# Patient Record
Sex: MALE | Race: BLACK OR AFRICAN AMERICAN | NOT HISPANIC OR LATINO | RURAL
[De-identification: names, ages, dates, MRNs, and addresses within clinical notes are randomized per-mention and may not be internally consistent; named-entity substitution may affect disease eponyms.]

---

## 2023-03-23 ENCOUNTER — HOSPITAL ENCOUNTER (EMERGENCY)
Facility: HOSPITAL | Age: 43
Discharge: HOME | End: 2023-03-23
Payer: COMMERCIAL

## 2023-03-23 VITALS
TEMPERATURE: 98 F | HEART RATE: 57 BPM | BODY MASS INDEX: 21 KG/M2 | RESPIRATION RATE: 18 BRPM | OXYGEN SATURATION: 100 % | HEIGHT: 71 IN | SYSTOLIC BLOOD PRESSURE: 115 MMHG | WEIGHT: 150 LBS | DIASTOLIC BLOOD PRESSURE: 78 MMHG

## 2023-03-23 DIAGNOSIS — R11.10 VOMITING: ICD-10-CM

## 2023-03-23 DIAGNOSIS — K59.00 CONSTIPATION, UNSPECIFIED CONSTIPATION TYPE: Primary | ICD-10-CM

## 2023-03-23 LAB
ANION GAP SERPL CALCULATED.3IONS-SCNC: 11 MMOL/L (ref 7–16)
BASOPHILS # BLD AUTO: 0.02 K/UL (ref 0–0.2)
BASOPHILS NFR BLD AUTO: 0.4 % (ref 0–1)
BASOPHILS NFR BLD MANUAL: 1 % (ref 0–1)
BILIRUB UR QL STRIP: NEGATIVE
BUN SERPL-MCNC: 13 MG/DL (ref 7–18)
BUN/CREAT SERPL: 13 (ref 6–20)
CALCIUM SERPL-MCNC: 9.4 MG/DL (ref 8.5–10.1)
CHLORIDE SERPL-SCNC: 107 MMOL/L (ref 98–107)
CLARITY UR: CLEAR
CO2 SERPL-SCNC: 28 MMOL/L (ref 21–32)
COLOR UR: NORMAL
CREAT SERPL-MCNC: 0.97 MG/DL (ref 0.7–1.3)
DIFFERENTIAL METHOD BLD: ABNORMAL
EGFR (NO RACE VARIABLE) (RUSH/TITUS): 99 ML/MIN/1.73M²
EOSINOPHIL # BLD AUTO: 0.24 K/UL (ref 0–0.5)
EOSINOPHIL NFR BLD AUTO: 5 % (ref 1–4)
EOSINOPHIL NFR BLD MANUAL: 3 % (ref 1–4)
ERYTHROCYTE [DISTWIDTH] IN BLOOD BY AUTOMATED COUNT: 13.2 % (ref 11.5–14.5)
GLUCOSE SERPL-MCNC: 89 MG/DL (ref 74–106)
GLUCOSE UR STRIP-MCNC: NORMAL MG/DL
HCT VFR BLD AUTO: 38.8 % (ref 40–54)
HGB BLD-MCNC: 13.4 G/DL (ref 13.5–18)
IMM GRANULOCYTES # BLD AUTO: 0.01 K/UL (ref 0–0.04)
IMM GRANULOCYTES NFR BLD: 0.2 % (ref 0–0.4)
KETONES UR STRIP-SCNC: NEGATIVE MG/DL
LEUKOCYTE ESTERASE UR QL STRIP: NEGATIVE
LYMPHOCYTES # BLD AUTO: 1.85 K/UL (ref 1–4.8)
LYMPHOCYTES NFR BLD AUTO: 38.7 % (ref 27–41)
LYMPHOCYTES NFR BLD MANUAL: 27 % (ref 27–41)
MCH RBC QN AUTO: 31.4 PG (ref 27–31)
MCHC RBC AUTO-ENTMCNC: 34.5 G/DL (ref 32–36)
MCV RBC AUTO: 90.9 FL (ref 80–96)
MONOCYTES # BLD AUTO: 0.51 K/UL (ref 0–0.8)
MONOCYTES NFR BLD AUTO: 10.7 % (ref 2–6)
MONOCYTES NFR BLD MANUAL: 9 % (ref 2–6)
MPC BLD CALC-MCNC: 10.3 FL (ref 9.4–12.4)
NEUTROPHILS # BLD AUTO: 2.15 K/UL (ref 1.8–7.7)
NEUTROPHILS NFR BLD AUTO: 45 % (ref 53–65)
NEUTS SEG NFR BLD MANUAL: 60 % (ref 50–62)
NITRITE UR QL STRIP: NEGATIVE
NRBC # BLD AUTO: 0 X10E3/UL
NRBC, AUTO (.00): 0 %
PH UR STRIP: 6 PH UNITS
PLATELET # BLD AUTO: 186 K/UL (ref 150–400)
PLATELET MORPHOLOGY: ABNORMAL
POTASSIUM SERPL-SCNC: 3.7 MMOL/L (ref 3.5–5.1)
PROT UR QL STRIP: NEGATIVE
RBC # BLD AUTO: 4.27 M/UL (ref 4.6–6.2)
RBC # UR STRIP: NEGATIVE /UL
RBC MORPH BLD: NORMAL
SODIUM SERPL-SCNC: 142 MMOL/L (ref 136–145)
SP GR UR STRIP: 1.01
UROBILINOGEN UR STRIP-ACNC: NORMAL MG/DL
WBC # BLD AUTO: 4.78 K/UL (ref 4.5–11)

## 2023-03-23 PROCEDURE — 99283 PR EMERGENCY DEPT VISIT,LEVEL III: ICD-10-PCS | Mod: ,,, | Performed by: NURSE PRACTITIONER

## 2023-03-23 PROCEDURE — 25500020 PHARM REV CODE 255: Performed by: NURSE PRACTITIONER

## 2023-03-23 PROCEDURE — 80048 BASIC METABOLIC PNL TOTAL CA: CPT | Performed by: NURSE PRACTITIONER

## 2023-03-23 PROCEDURE — 81003 URINALYSIS AUTO W/O SCOPE: CPT | Performed by: FAMILY MEDICINE

## 2023-03-23 PROCEDURE — 99283 EMERGENCY DEPT VISIT LOW MDM: CPT | Mod: ,,, | Performed by: NURSE PRACTITIONER

## 2023-03-23 PROCEDURE — 85025 COMPLETE CBC W/AUTO DIFF WBC: CPT | Performed by: FAMILY MEDICINE

## 2023-03-23 PROCEDURE — 99285 EMERGENCY DEPT VISIT HI MDM: CPT | Mod: 25

## 2023-03-23 RX ORDER — DOCUSATE SODIUM 100 MG/1
100 CAPSULE, LIQUID FILLED ORAL 2 TIMES DAILY PRN
Qty: 30 CAPSULE | Refills: 0 | Status: SHIPPED | OUTPATIENT
Start: 2023-03-23

## 2023-03-23 RX ORDER — ONDANSETRON 4 MG/1
4 TABLET, FILM COATED ORAL EVERY 6 HOURS PRN
Qty: 12 TABLET | Refills: 0 | Status: SHIPPED | OUTPATIENT
Start: 2023-03-23

## 2023-03-23 RX ORDER — POLYETHYLENE GLYCOL 3350 17 G/17G
17 POWDER, FOR SOLUTION ORAL DAILY
Qty: 10 EACH | Refills: 0 | Status: SHIPPED | OUTPATIENT
Start: 2023-03-23 | End: 2023-04-02

## 2023-03-23 RX ADMIN — IOPAMIDOL 100 ML: 755 INJECTION, SOLUTION INTRAVENOUS at 01:03

## 2023-03-23 NOTE — ED TRIAGE NOTES
PATIENT PRESENTS TO EMERGENCY DEPARTMENT VIA [facility] WITH REPORT OF ABD PAIN AND 2 EPISODES OF VOMITING

## 2023-03-23 NOTE — ED PROVIDER NOTES
Encounter Date: 3/23/2023       History     Chief Complaint   Patient presents with    Abdominal Pain     42-year-old male presents to the emergency department to be evaluated from [facility] for abdominal pain and vomiting that began at 3:00 a.m. this morning.  He reports that he had a normal bowel movement yesterday.  Denied that if any fever, dysuria, chest pain, shortness of breath, chills.    The history is provided by the patient.   Abdominal Pain  The current episode started several hours ago. The abdominal pain is located in the RLQ. The other symptoms of the illness include nausea and vomiting. The other symptoms of the illness do not include fever, fatigue, jaundice, melena, diarrhea, dysuria, hematemesis, hematochezia, vaginal discharge or vaginal bleeding.   Symptoms associated with the illness do not include chills, anorexia, diaphoresis, heartburn, constipation, urgency, hematuria, frequency or back pain.   Review of patient's allergies indicates:  No Known Allergies  History reviewed. No pertinent past medical history.  History reviewed. No pertinent surgical history.  History reviewed. No pertinent family history.  Social History     Tobacco Use    Smoking status: Every Day     Types: Cigarettes   Substance Use Topics    Alcohol use: Yes     Review of Systems   Constitutional:  Negative for chills, diaphoresis, fatigue and fever.   Gastrointestinal:  Positive for abdominal pain, nausea and vomiting. Negative for anorexia, constipation, diarrhea, heartburn, hematemesis, hematochezia, jaundice and melena.   Genitourinary:  Negative for dysuria, frequency, hematuria, urgency, vaginal bleeding and vaginal discharge.   Musculoskeletal:  Negative for back pain.   All other systems reviewed and are negative.    Physical Exam     Initial Vitals [03/23/23 1100]   BP Pulse Resp Temp SpO2   134/82 (!) 53 16 97.6 °F (36.4 °C) 98 %      MAP       --         Physical Exam    Vitals  reviewed.  Constitutional: He appears well-developed and well-nourished.   Neck: Neck supple.   Cardiovascular:  Normal rate and regular rhythm.           Pulmonary/Chest: Breath sounds normal.   Abdominal: Abdomen is soft. Bowel sounds are normal. He exhibits no distension and no mass. There is abdominal tenderness (Moderate tenderness to the right lower abdomen). There is no rebound and no guarding.   Musculoskeletal:         General: Normal range of motion.      Cervical back: Neck supple.     Neurological: He is alert and oriented to person, place, and time. He has normal strength. GCS score is 15. GCS eye subscore is 4. GCS verbal subscore is 5. GCS motor subscore is 6.   Skin: Skin is warm and dry. Capillary refill takes less than 2 seconds.   Psychiatric: He has a normal mood and affect.       Medical Screening Exam   See Full Note    ED Course   Procedures  Labs Reviewed   CBC WITH DIFFERENTIAL - Abnormal; Notable for the following components:       Result Value    RBC 4.27 (*)     Hemoglobin 13.4 (*)     Hematocrit 38.8 (*)     MCH 31.4 (*)     Neutrophils % 45.0 (*)     Monocytes % 10.7 (*)     Eosinophils % 5.0 (*)     All other components within normal limits   MANUAL DIFFERENTIAL - Abnormal; Notable for the following components:    Monocytes, Man % 9 (*)     Platelet Morphology Large & Giant Platelets (*)     All other components within normal limits   BASIC METABOLIC PANEL - Normal   CBC W/ AUTO DIFFERENTIAL    Narrative:     The following orders were created for panel order CBC auto differential.  Procedure                               Abnormality         Status                     ---------                               -----------         ------                     CBC with Differential[[id number]]        Abnormal            Final result               Manual Differential[[id number]]          Abnormal            Final result                 Please view results for these tests on the individual orders.    URINALYSIS, REFLEX TO URINE CULTURE          Imaging Results              CT Abdomen Pelvis With Contrast (Final result)  Result time 03/23/23 13:07:32      Final result by Arnaud Melo MD (03/23/23 13:07:32)                   Impression:      No acute abnormality identified in the abdomen or pelvis.      Electronically signed by: Arnaud Melo  Date:    03/23/2023  Time:    13:07               Narrative:    EXAMINATION:  CT ABDOMEN PELVIS WITH CONTRAST    CLINICAL HISTORY:  RLQ abdominal pain (Age >= 14y);    TECHNIQUE:  Low dose axial images, sagittal and coronal reformations were obtained from the lung bases to the pubic symphysis following the IV administration of 100 mL of Isovue 370 .  Oral contrast was not given.    The CT examination was performed using one or more of the following dose reduction techniques: Automated exposure control, adjustment of the mA and kV according to patient's size, use of acute or iterative reconstruction techniques.    COMPARISON:  None.    FINDINGS:  Lung bases are clear.    Liver, gallbladder, adrenals, spleen, kidneys, and pancreas appear normal.  No renal stone.  No hydronephrosis.  No hydroureter.  The urinary bladder is unremarkable.    No pneumoperitoneum.  No ascites.    Appendix identified and normal.  No bowel obstruction or acute bowel abnormality.  No adenopathy.  Vascular structures appear normal.    No acute fracture or osseous lesion.                                       X-Ray Abdomen Flat And Erect (Final result)  Result time 03/23/23 11:31:08      Final result by Danilo Oliva DO (03/23/23 11:31:08)                   Impression:      No acute findings.    Point of Service: [facility]      Electronically signed by: Danilo Oliva  Date:    03/23/2023  Time:    11:31               Narrative:    EXAMINATION:  XR ABDOMEN FLAT AND ERECT    CLINICAL HISTORY:  Vomiting, unspecified    COMPARISON:  None    TECHNIQUE:  Frontal views of the abdomen in the  supine and upright position.    FINDINGS:  Nonspecific nonobstructive bowel gas pattern.  No free intraperitoneal air demonstrated.  Visualized osseous and surrounding soft tissue structures demonstrate no acute abnormality.  Lung bases clear.                                       Medications   iopamidoL (ISOVUE-370) injection 100 mL (100 mLs Intravenous Given 3/23/23 1302)     Medical Decision Makin-year-old male presents to the emergency department to be evaluated from [facility] for abdominal pain and vomiting that began at 3:00 a.m. this morning.  He reports that he had a normal bowel movement yesterday.  Denied that if any fever, dysuria, chest pain, shortness of breath, chills.  I ordered labs and personally reviewed them.   I ordered X-rays and personally reviewed them and reviewed the radiologist interpretation.  Xray significant for no acute process.    Diagnosis: constipation    Patient discharged with prescriptions for Zofran, Colace and MiraLax  Patient was discharged in stable condition.  Detailed return precautions discussed.                   Clinical Impression:   Final diagnoses:  [R11.10] Vomiting  [K59.00] Constipation, unspecified constipation type (Primary)        ED Disposition Condition    Discharge Stable          ED Prescriptions       Medication Sig Dispense Start Date End Date Auth. Provider    ondansetron (ZOFRAN) 4 MG tablet Take 1 tablet (4 mg total) by mouth every 6 (six) hours as needed for Nausea (as needed for vomiting). 12 tablet 3/23/2023 -- TRINI Paredes    polyethylene glycol (GLYCOLAX) 17 gram PwPk Take 17 g by mouth once daily. for 10 days 10 each 3/23/2023 2023 TRINI Paredes    docusate sodium (COLACE) 100 MG capsule Take 1 capsule (100 mg total) by mouth 2 (two) times daily as needed for Constipation. 30 capsule 3/23/2023 -- TRINI Paredes          Follow-up Information    None          Radha Beal  [facility]  03/23/23 3128